# Patient Record
Sex: FEMALE | Employment: OTHER | URBAN - METROPOLITAN AREA
[De-identification: names, ages, dates, MRNs, and addresses within clinical notes are randomized per-mention and may not be internally consistent; named-entity substitution may affect disease eponyms.]

---

## 2017-02-20 ENCOUNTER — OFFICE VISIT (OUTPATIENT)
Dept: PRIMARY CARE CLINIC | Age: 67
End: 2017-02-20

## 2017-02-20 VITALS
WEIGHT: 182 LBS | HEIGHT: 63 IN | DIASTOLIC BLOOD PRESSURE: 70 MMHG | HEART RATE: 83 BPM | TEMPERATURE: 97.7 F | SYSTOLIC BLOOD PRESSURE: 120 MMHG | OXYGEN SATURATION: 98 % | BODY MASS INDEX: 32.25 KG/M2 | RESPIRATION RATE: 16 BRPM

## 2017-02-20 DIAGNOSIS — R81 GLYCOSURIA: ICD-10-CM

## 2017-02-20 DIAGNOSIS — I10 HTN (HYPERTENSION), BENIGN: ICD-10-CM

## 2017-02-20 DIAGNOSIS — E03.9 HYPOTHYROIDISM (ACQUIRED): ICD-10-CM

## 2017-02-20 DIAGNOSIS — M17.12 PRIMARY OSTEOARTHRITIS OF LEFT KNEE: Primary | ICD-10-CM

## 2017-02-20 DIAGNOSIS — M79.7 FIBROMYALGIA: ICD-10-CM

## 2017-02-20 DIAGNOSIS — E78.2 MIXED HYPERLIPIDEMIA: ICD-10-CM

## 2017-02-20 DIAGNOSIS — E11.51 DM (DIABETES MELLITUS) TYPE II CONTROLLED PERIPHERAL VASCULAR DISORDER: ICD-10-CM

## 2017-02-20 LAB
BILIRUBIN, POC: ABNORMAL
BLOOD URINE, POC: ABNORMAL
CLARITY, POC: CLEAR
COLOR, POC: ABNORMAL
CREATININE URINE: 46.6 MG/DL
GLUCOSE URINE, POC: ABNORMAL
KETONES, POC: ABNORMAL
LEUKOCYTE EST, POC: ABNORMAL
MICROALBUMIN UR-MCNC: <1.2 MG/DL
MICROALBUMIN/CREAT UR-RTO: NORMAL MG/G (ref 0–30)
NITRITE, POC: ABNORMAL
PH, POC: 6
PROTEIN, POC: ABNORMAL
SPECIFIC GRAVITY, POC: 1.03
UROBILINOGEN, POC: ABNORMAL

## 2017-02-20 PROCEDURE — 81003 URINALYSIS AUTO W/O SCOPE: CPT | Performed by: FAMILY MEDICINE

## 2017-02-20 PROCEDURE — 99214 OFFICE O/P EST MOD 30 MIN: CPT | Performed by: FAMILY MEDICINE

## 2017-02-20 RX ORDER — TIZANIDINE 2 MG/1
2 TABLET ORAL EVERY 8 HOURS PRN
Qty: 90 TABLET | Refills: 1 | Status: SHIPPED | OUTPATIENT
Start: 2017-02-20 | End: 2017-06-26 | Stop reason: SDUPTHER

## 2017-02-22 LAB — URINE CULTURE, ROUTINE: NORMAL

## 2017-03-03 ENCOUNTER — HOSPITAL ENCOUNTER (OUTPATIENT)
Dept: MRI IMAGING | Age: 67
Discharge: HOME | End: 2017-03-03
Payer: MEDICARE

## 2017-03-03 DIAGNOSIS — M17.12 PRIMARY OSTEOARTHRITIS OF LEFT KNEE: ICD-10-CM

## 2017-03-03 PROCEDURE — 73721 MRI JNT OF LWR EXTRE W/O DYE: CPT

## 2017-03-04 ASSESSMENT — ENCOUNTER SYMPTOMS
SHORTNESS OF BREATH: 0
ABDOMINAL PAIN: 0
COUGH: 0
CHEST TIGHTNESS: 0

## 2017-03-08 ENCOUNTER — TELEPHONE (OUTPATIENT)
Dept: PRIMARY CARE CLINIC | Age: 67
End: 2017-03-08

## 2017-03-08 DIAGNOSIS — M17.12 PRIMARY OSTEOARTHRITIS OF LEFT KNEE: Primary | ICD-10-CM

## 2017-03-09 ENCOUNTER — OFFICE VISIT (OUTPATIENT)
Dept: PRIMARY CARE CLINIC | Age: 67
End: 2017-03-09

## 2017-03-09 VITALS
HEART RATE: 70 BPM | BODY MASS INDEX: 31.89 KG/M2 | WEIGHT: 180 LBS | SYSTOLIC BLOOD PRESSURE: 112 MMHG | HEIGHT: 63 IN | OXYGEN SATURATION: 98 % | RESPIRATION RATE: 16 BRPM | DIASTOLIC BLOOD PRESSURE: 60 MMHG | TEMPERATURE: 97 F

## 2017-03-09 DIAGNOSIS — E11.51 DM (DIABETES MELLITUS) TYPE II CONTROLLED PERIPHERAL VASCULAR DISORDER: ICD-10-CM

## 2017-03-09 DIAGNOSIS — I10 HTN (HYPERTENSION), BENIGN: Primary | ICD-10-CM

## 2017-03-09 DIAGNOSIS — E78.2 MIXED HYPERLIPIDEMIA: ICD-10-CM

## 2017-03-09 DIAGNOSIS — M79.7 FIBROMYALGIA: ICD-10-CM

## 2017-03-09 DIAGNOSIS — M17.12 OSTEOARTHRITIS OF LEFT KNEE, UNSPECIFIED OSTEOARTHRITIS TYPE: ICD-10-CM

## 2017-03-09 PROCEDURE — 99213 OFFICE O/P EST LOW 20 MIN: CPT | Performed by: FAMILY MEDICINE

## 2017-03-09 RX ORDER — EZETIMIBE 10 MG/1
TABLET ORAL
COMMUNITY
Start: 2017-01-17

## 2017-03-09 ASSESSMENT — ENCOUNTER SYMPTOMS
EYES NEGATIVE: 1
PHOTOPHOBIA: 0
EYE REDNESS: 0
DIARRHEA: 0
EYE ITCHING: 0
SHORTNESS OF BREATH: 0
BACK PAIN: 0
ABDOMINAL PAIN: 0
CONSTIPATION: 0
RESPIRATORY NEGATIVE: 1
GASTROINTESTINAL NEGATIVE: 1
WHEEZING: 0

## 2017-03-24 LAB
CHOLESTEROL, TOTAL: 154 MG/DL
CHOLESTEROL/HDL RATIO: NORMAL
HBA1C MFR BLD: 6.7 %
HDLC SERPL-MCNC: 44 MG/DL (ref 35–70)
LDL CHOLESTEROL CALCULATED: 87 MG/DL (ref 0–160)
TRIGL SERPL-MCNC: 115 MG/DL
VLDLC SERPL CALC-MCNC: 23 MG/DL

## 2017-03-27 ENCOUNTER — TELEPHONE (OUTPATIENT)
Dept: PRIMARY CARE CLINIC | Age: 67
End: 2017-03-27

## 2017-06-26 DIAGNOSIS — M79.7 FIBROMYALGIA: ICD-10-CM

## 2017-06-26 RX ORDER — TIZANIDINE 2 MG/1
TABLET ORAL
Qty: 90 TABLET | Refills: 1 | Status: SHIPPED | OUTPATIENT
Start: 2017-06-26 | End: 2017-09-14 | Stop reason: SDUPTHER

## 2017-09-14 ENCOUNTER — OFFICE VISIT (OUTPATIENT)
Dept: PRIMARY CARE CLINIC | Age: 67
End: 2017-09-14

## 2017-09-14 VITALS
HEART RATE: 83 BPM | BODY MASS INDEX: 32.07 KG/M2 | WEIGHT: 181 LBS | DIASTOLIC BLOOD PRESSURE: 60 MMHG | TEMPERATURE: 97.7 F | OXYGEN SATURATION: 95 % | SYSTOLIC BLOOD PRESSURE: 116 MMHG | RESPIRATION RATE: 16 BRPM | HEIGHT: 63 IN

## 2017-09-14 DIAGNOSIS — M17.12 PRIMARY OSTEOARTHRITIS OF LEFT KNEE: Primary | ICD-10-CM

## 2017-09-14 DIAGNOSIS — M79.7 FIBROMYALGIA: ICD-10-CM

## 2017-09-14 DIAGNOSIS — G89.29 CHRONIC RIGHT SHOULDER PAIN: ICD-10-CM

## 2017-09-14 DIAGNOSIS — Z23 NEED FOR INFLUENZA VACCINATION: ICD-10-CM

## 2017-09-14 DIAGNOSIS — M25.511 CHRONIC RIGHT SHOULDER PAIN: ICD-10-CM

## 2017-09-14 PROCEDURE — 99214 OFFICE O/P EST MOD 30 MIN: CPT | Performed by: FAMILY MEDICINE

## 2017-09-14 PROCEDURE — 20610 DRAIN/INJ JOINT/BURSA W/O US: CPT | Performed by: FAMILY MEDICINE

## 2017-09-14 PROCEDURE — 90662 IIV NO PRSV INCREASED AG IM: CPT | Performed by: FAMILY MEDICINE

## 2017-09-14 RX ORDER — DICLOFENAC SODIUM 75 MG/1
75 TABLET, DELAYED RELEASE ORAL 2 TIMES DAILY
Qty: 60 TABLET | Refills: 0 | Status: SHIPPED | OUTPATIENT
Start: 2017-09-14 | End: 2017-12-30 | Stop reason: SDUPTHER

## 2017-09-14 RX ORDER — HYDROCODONE BITARTRATE AND ACETAMINOPHEN 5; 325 MG/1; MG/1
1 TABLET ORAL EVERY 6 HOURS PRN
Qty: 28 TABLET | Refills: 0 | Status: SHIPPED | OUTPATIENT
Start: 2017-09-14

## 2017-09-14 RX ORDER — METHYLPREDNISOLONE ACETATE 80 MG/ML
80 INJECTION, SUSPENSION INTRA-ARTICULAR; INTRALESIONAL; INTRAMUSCULAR; SOFT TISSUE ONCE
Status: COMPLETED | OUTPATIENT
Start: 2017-09-14 | End: 2017-09-14

## 2017-09-14 RX ORDER — TIZANIDINE 2 MG/1
TABLET ORAL
Qty: 90 TABLET | Refills: 1 | Status: SHIPPED | OUTPATIENT
Start: 2017-09-14 | End: 2017-12-30 | Stop reason: SDUPTHER

## 2017-09-14 RX ADMIN — METHYLPREDNISOLONE ACETATE 80 MG: 80 INJECTION, SUSPENSION INTRA-ARTICULAR; INTRALESIONAL; INTRAMUSCULAR; SOFT TISSUE at 15:42

## 2017-09-14 ASSESSMENT — ENCOUNTER SYMPTOMS
SORE THROAT: 0
NAUSEA: 0
CONSTIPATION: 0
VOMITING: 0
COUGH: 0
WHEEZING: 0
DIARRHEA: 0
SINUS PRESSURE: 0
RESPIRATORY NEGATIVE: 1
ABDOMINAL PAIN: 0
BACK PAIN: 0
SHORTNESS OF BREATH: 0

## 2017-09-18 ENCOUNTER — TELEPHONE (OUTPATIENT)
Dept: PRIMARY CARE CLINIC | Age: 67
End: 2017-09-18

## 2017-10-02 ENCOUNTER — TELEPHONE (OUTPATIENT)
Dept: PRIMARY CARE CLINIC | Age: 67
End: 2017-10-02

## 2017-10-02 DIAGNOSIS — Z12.11 SCREENING FOR COLON CANCER: Primary | ICD-10-CM

## 2017-10-02 DIAGNOSIS — M25.511 RIGHT SHOULDER PAIN, UNSPECIFIED CHRONICITY: ICD-10-CM

## 2017-10-26 ENCOUNTER — HOSPITAL ENCOUNTER (OUTPATIENT)
Dept: PHYSICAL THERAPY | Age: 67
Setting detail: THERAPIES SERIES
Discharge: HOME | End: 2017-10-26
Payer: MEDICARE

## 2017-10-26 PROCEDURE — G8985 CARRY GOAL STATUS: HCPCS

## 2017-10-26 PROCEDURE — 97162 PT EVAL MOD COMPLEX 30 MIN: CPT

## 2017-10-26 PROCEDURE — G8984 CARRY CURRENT STATUS: HCPCS

## 2017-10-26 ASSESSMENT — PAIN DESCRIPTION - PAIN TYPE: TYPE: CHRONIC PAIN

## 2017-10-26 ASSESSMENT — PAIN DESCRIPTION - LOCATION: LOCATION: NECK;ARM;SHOULDER

## 2017-10-26 ASSESSMENT — PAIN DESCRIPTION - DESCRIPTORS: DESCRIPTORS: STABBING

## 2017-10-26 ASSESSMENT — PAIN SCALES - GENERAL: PAINLEVEL_OUTOF10: 8

## 2017-10-26 ASSESSMENT — PAIN DESCRIPTION - ORIENTATION: ORIENTATION: RIGHT

## 2017-10-26 ASSESSMENT — PAIN DESCRIPTION - FREQUENCY: FREQUENCY: CONTINUOUS

## 2017-10-26 NOTE — PROGRESS NOTES
[facility]   Outpatient Physical Therapy   Evaluation      [] [address]  [x] [facility]     Date: 10/26/2017  Patient: Bisi Montiel  : 1950  ACCT #: [de-identified]  Referring physician: Referring Practitioner: Dr. Nikolai Herbert     Referring Practitioner: Dr. Nikolai Herbert     Referral Date : 10/02/17    Diagnosis: Rt shoulder pain     Treatment Diagnosis: Rt UE pain, Rt UE weakness, decreased Rt UE ROM, impaired ADLs, decreased cervical ROM   PT Insurance Information: BCBS Medicare  Total # of Visits Approved: 1   Total # of Visits to Date: 1  No Show: 0    History   has a past medical history of CAD (coronary artery disease); Fibromyalgia; Hyperlipidemia; Hypertension; and Type II or unspecified type diabetes mellitus without mention of complication, not stated as uncontrolled. has a past surgical history that includes ovarian cyst removal;  section; Coronary angioplasty with stent; Carotid-subclavian Bypass Graft; and other surgical history (2016). Allergies   Allergen Reactions    Actos [Pioglitazone Hydrochloride]      Current Outpatient Prescriptions on File Prior to Encounter   Medication Sig Dispense Refill    tiZANidine (ZANAFLEX) 2 MG tablet TAKE 1 TABLET EVERY 8 HOURS AS NEEDED 90 tablet 1    HYDROcodone-acetaminophen (NORCO) 5-325 MG per tablet Take 1 tablet by mouth every 6 hours as needed for Pain . Earliest Fill Date: 17 28 tablet 0    diclofenac (VOLTAREN) 75 MG EC tablet Take 1 tablet by mouth 2 times daily 60 tablet 0    ZETIA 10 MG tablet       dapagliflozin (FARXIGA) 10 MG tablet Take 1 tablet by mouth      gabapentin (NEURONTIN) 300 MG capsule Take 300 mg by mouth      Vitamin D (CHOLECALCIFEROL) 1000 UNITS CAPS capsule Take 2,000 Units by mouth daily.       clopidogrel (PLAVIX) 75 MG tablet TAKE 1 TABLET DAILY 90 tablet 1    glimepiride (AMARYL) 4 MG tablet Take 4 mg by mouth 2 times daily.  metformin (GLUCOPHAGE) 500 MG tablet Take 750 mg by mouth daily.  Insulin Pen Needle (B-D UF III MINI PEN NEEDLES) 31G X 5 MM MISC by Does not apply route.  fexofenadine (ALLEGRA) 180 MG tablet Take 180 mg by mouth daily.  isosorbide mononitrate (IMDUR) 30 MG CR tablet Take 30 mg by mouth daily.  aspirin 81 MG EC tablet Take 81 mg by mouth daily.  Multiple Vitamin (MULTIVITAMIN PO) Take  by mouth daily.  losartan (COZAAR) 25 MG tablet Take 1 tablet by mouth daily. 30 tablet 3     Current Facility-Administered Medications on File Prior to Encounter   Medication Dose Route Frequency Provider Last Rate Last Dose    methylPREDNISolone acetate (DEPO-MEDROL) injection 80 mg  80 mg Intra-articular Once Caitlyn Back, DO        triamcinolone acetonide (KENALOG-40) injection 80 mg  80 mg Intramuscular Once Caitlyn Back,         cefTRIAXone (ROCEPHIN) injection 1 g  1 g Intramuscular Once Caitlyn Back,         methylPREDNISolone acetate (DEPO-MEDROL) injection 80 mg  80 mg Intra-articular Once Caitlyn Back, DO            Subjective  Subjective: Pt reports insidious onset of Rt shoulder pain ~3 mos. Reports initial fluctuating symptoms. Now more constant pain. Reports pain initiates in Rt UT/ cervical area and radiates into shoulder and down into elbow posterolateral side. Pt states no Xrays or MRI at this time. Referred for PT due to good results in the past for Rt shoulder ~ 8 yrs ago. Pt saw neurosurgeon this am for low back, recommending surgery. pt states tylenol, gabapentin and muscle relaxer. Takes Norco ~ 1 time per week.     Additional Pertinent Hx: CAD, DM, cervical lami ~20 yrs ago, OA, cardiac stents and renal stent  Pain Screening  Patient Currently in Pain: Yes  Pain Assessment  Pain Assessment: 0-10  Pain Level: 8 (Range: 4-10/10 )  Pain Type: Chronic pain  Pain Location: Neck, Arm, Shoulder  Pain Orientation: Description same as pre-pain unless otherwise indicated. Action: [] NA  [x] Call Physician  [x] Perform HEP  [x] Meds as prescribed     Assessment   Conditions Requiring Skilled Therapeutic Intervention  Body structures, Functions, Activity limitations: Decreased ADL status, Decreased ROM, Decreased strength, Decreased coordination  Assessment: Pt with tenderness throughout Rt shldr jt, UT, biceps, and lateral epicondyle. Pt with h/o cervical laminectomy. Noted sig decrease cervical, as well as Rt UE, ROM. Pt denies numbness or tingling. States pain originates in Rt UT area and radiates to Rt lateral UE to elbow. Pt with h/o chronic back pain, states neurosurgeon is recommending surgery, which may impact this therapy. Treatment Diagnosis: Rt UE pain, Rt UE weakness, decreased Rt UE ROM, impaired ADLs, decreased cervical ROM   Prognosis: Good  Decision Making: Medium Complexity  History: personal factors: age, more sedentary lifestyle; contributing PMH: CAD, DM, cervical lami ~20 yrs ago, OA, cardiac stents and renal stent  Exam: musculoskeletal, pain and sensory systems involved impacting ADLs, strength, ROM; UEFI 42/80  Clinical Presentation: evolving - worsening of symptoms   Patient Education: therapy process, POC, keep stretches and HEP WFL with minimal pain and discomfort   Barriers to Learning: no  REQUIRES PT FOLLOW UP: Yes    Patient Education   Patient Education: therapy process, POC, keep stretches and HEP WFL with minimal pain and discomfort     Pt verbalized/demonstrated good understanding:     [x] Yes         [] No, pt required further clarification. G Code:     PT G-Codes  Functional Assessment Tool Used: UEFI and clinical judgment   Score: 42/80  Functional Limitation: Carrying, moving and handling objects  Carrying, Moving and Handling Objects Current Status ():  At least 40 percent but less than 60 percent impaired, limited or restricted  Carrying, Moving and Handling Objects Goal Status (): At least 20 percent but less than 40 percent impaired, limited or restricted    Goals   Patient goal: Patient goals : \"relieve pain\"     Short term goals  Time Frame for Short term goals: 2 wks   Short term goal 1: Independent with HEP for strengthening, flexibility, and improved function     Long term goals  Time Frame for Long term goals : 6 wks   Long term goal 1: Improve Rt UE strength >/= 4+/5 to allow improve ease with vacuuming  Long term goal 2: Improve Rt UE ROM WFL to allow pt to wash and do hair without difficulty   Long term goal 3: Improve cervical ROM by 5-10 degrees each plane for improved posture and decreased pain UT   Long term goal 4: UEFI >/= 60/80 to demonstrate overall improved function     Plan:  Plan  Times per week: 2, progress to 1x per week as appropriate   Plan weeks: 6  Current Treatment Recommendations: Strengthening, ROM, Endurance Training, Manual Therapy - Soft Tissue Mobilization, Manual Therapy - Joint Manipulation, Home Exercise Program, Pain Management, Patient/Caregiver Education & Training, Modalities       Evaluation and patient rights have been reviewed and patient agrees with plan of care.   Yes  [x]  No  []   Explain:     Signature: Electronically signed by Rfafi Montes PT on 10/26/2017 at 2:49 PM    PT Individual Minutes  Time In: 7845  Time Out: 2599  Minutes: 27  Timed Code Treatment Minutes: 0 Minutes (requires insurance authorization )  Procedure Minutes: 27 vignesh Bustos Fall Risk Assessment  Risk Factor Scale  Score   History of Falls [] Yes  [x] No 25  0 0   Secondary Diagnosis [] Yes  [x] No 15  0 0   Ambulatory Aid [] Furniture  [] Crutches/cane/walker  [x] None/bedrest/wheelchair/nurse 30  15  0 0   IV/Heparin Lock [] Yes  [x] No 20  0 0   Gait/Transferring [] Impaired  [] Weak  [x] Normal/bedrest/immobile 20  10  0 0   Mental Status [] Forgets limitations  [x] Oriented to own ability 15  0 0      Total: 0     Based on the Assessment score:

## 2017-10-26 NOTE — PLAN OF CARE
AROM : Exceptions  L Shoulder Flexion 0-180: 176  L Shoulder ABduction 0-180: 152  L Shoulder Int Rotation  0-70: 74  L Shoulder Ext Rotation  0-90: 71   [] yes  [] no   Long term goal 3: Improve cervical ROM by 5-10 degrees each plane for improved posture and decreased pain UT   Spine  Cervical: flex 42, ext 33, Rt SB 22, Lt SB 38    [] yes  [] no   Long term goal 4: UEFI >/= 60/80 to demonstrate overall improved function  UEFI: 42/80 [] yes  [] no     Body structures, Functions, Activity limitations: Decreased ADL status, Decreased ROM, Decreased strength, Decreased coordination  Assessment: Pt with tenderness throughout Rt shldr jt, UT, biceps, and lateral epicondyle. Pt with h/o cervical laminectomy. Noted sig decrease cervical, as well as Rt UE, ROM. Pt denies numbness or tingling. States pain originates in Rt UT area and radiates to Rt lateral UE to elbow. Pt with h/o chronic back pain, states neurosurgeon is recommending surgery, which may impact this therapy. Prognosis: Good  New Education Provided: therapy process, POC, keep stretches and HEP WFL with minimal pain and discomfort   G-Codes  PT G-Codes  Functional Assessment Tool Used: UEFI and clinical judgment   Score: 42/80  Functional Limitation: Carrying, moving and handling objects  Carrying, Moving and Handling Objects Current Status (): At least 40 percent but less than 60 percent impaired, limited or restricted  Carrying, Moving and Handling Objects Goal Status ():  At least 20 percent but less than 40 percent impaired, limited or restricted    PLAN: [x] Evaluate and Treat  Frequency/Duration:  Plan  Times per week: 2, progress to 1x per week as appropriate   Plan weeks: 6  Current Treatment Recommendations: Strengthening, ROM, Endurance Training, Manual Therapy - Soft Tissue Mobilization, Manual Therapy - Joint Manipulation, Home Exercise Program, Pain Management, Patient/Caregiver Education & Training, Modalities     Precautions:

## 2017-11-06 ENCOUNTER — HOSPITAL ENCOUNTER (OUTPATIENT)
Dept: PHYSICAL THERAPY | Age: 67
Setting detail: THERAPIES SERIES
Discharge: HOME | End: 2017-11-06
Payer: MEDICARE

## 2017-11-06 PROCEDURE — 97140 MANUAL THERAPY 1/> REGIONS: CPT

## 2017-11-06 PROCEDURE — 97110 THERAPEUTIC EXERCISES: CPT

## 2017-11-06 PROCEDURE — 97035 APP MDLTY 1+ULTRASOUND EA 15: CPT

## 2017-11-06 ASSESSMENT — PAIN DESCRIPTION - DESCRIPTORS: DESCRIPTORS: ACHING;STABBING

## 2017-11-06 ASSESSMENT — PAIN SCALES - GENERAL: PAINLEVEL_OUTOF10: 7

## 2017-11-06 ASSESSMENT — PAIN DESCRIPTION - LOCATION: LOCATION: ARM;NECK;SHOULDER

## 2017-11-06 NOTE — PROGRESS NOTES
Perform HEP  [] Meds as prescribed     ASSESSMENT:     Conditions Requiring Skilled Therapeutic Intervention  Assessment: Initiated exercises for strengthening and to improve ROM. Initiated US and manual to decrease spasm and pain and improve ROM. Good tolerance to treatment and HEP given. Pt states may not be able to attend another session this week due to receiving back injections. Tolerance to treatment: [x] Good   [] Fair   [] Poor  [] Fatigued   [] Increased pain   Limited by:    Goals:     Short term goals  Time Frame for Short term goals: 2 wks   Short term goal 1: Independent with HEP for strengthening, flexibility, and improved function   Long term goals  Time Frame for Long term goals : 6 wks   Long term goal 1: Improve Rt UE strength >/= 4+/5 to allow improve ease with vacuuming  Long term goal 2: Improve Rt UE ROM WFL to allow pt to wash and do hair without difficulty   Long term goal 3: Improve cervical ROM by 5-10 degrees each plane for improved posture and decreased pain UT   Long term goal 4: UEFI >/= 60/80 to demonstrate overall improved function     Progress toward goals: ROM   Goals Met:    []  See updated POC   Comments:    PLAN:  [x] Continue POC to pt tolerance  [] Hold PT for ___ days.   See note to physician  [] Discharge PT    Signature:   Electronically signed by Harley Villatoro PT on 11/6/17 at 3:59 PM    PT Individual Minutes  Time In: 5699  Time Out: 6115  Minutes: 54  Timed Code Treatment Minutes: 54 Minutes

## 2017-11-13 ENCOUNTER — HOSPITAL ENCOUNTER (OUTPATIENT)
Dept: PHYSICAL THERAPY | Age: 67
Setting detail: THERAPIES SERIES
Discharge: HOME | End: 2017-11-13
Payer: MEDICARE

## 2017-11-13 PROCEDURE — 97140 MANUAL THERAPY 1/> REGIONS: CPT

## 2017-11-13 PROCEDURE — 97110 THERAPEUTIC EXERCISES: CPT

## 2017-11-13 PROCEDURE — 97035 APP MDLTY 1+ULTRASOUND EA 15: CPT

## 2017-11-13 ASSESSMENT — PAIN DESCRIPTION - PAIN TYPE: TYPE: CHRONIC PAIN

## 2017-11-13 ASSESSMENT — PAIN SCALES - GENERAL: PAINLEVEL_OUTOF10: 6

## 2017-11-13 ASSESSMENT — PAIN DESCRIPTION - ORIENTATION: ORIENTATION: RIGHT

## 2017-11-13 ASSESSMENT — PAIN DESCRIPTION - LOCATION: LOCATION: ARM;NECK;SHOULDER

## 2017-11-13 ASSESSMENT — PAIN DESCRIPTION - DESCRIPTORS: DESCRIPTORS: ACHING

## 2017-11-13 NOTE — PROGRESS NOTES
Call Physician  [x] Perform HEP  [] Meds as prescribed     ASSESSMENT:     Conditions Requiring Skilled Therapeutic Intervention  Body structures, Functions, Activity limitations: Decreased ADL status, Decreased ROM, Decreased strength, Decreased coordination  Assessment: Pt tolerating all strengthening ex's well w/o increase in pain reported throughout session. Initiated manual distraction and STM to R UT and deltoid dto decrease pain w/ good tolerance. VC's during pendulums for passive movement vs AROM. Concluded w/ US w/ reports of decreased pain post tx session. Treatment Diagnosis: Rt UE pain, Rt UE weakness, decreased Rt UE ROM, impaired ADLs, decreased cervical ROM   Prognosis: Good  REQUIRES PT FOLLOW UP: Yes     Tolerance to treatment: [x] Good   [] Fair   [] Poor  [] Fatigued   [] Increased pain   Limited by:    Goals:  Short term goals  Time Frame for Short term goals: 2 wks   Short term goal 1: Independent with HEP for strengthening, flexibility, and improved function   Long term goals  Time Frame for Long term goals : 6 wks   Long term goal 1: Improve Rt UE strength >/= 4+/5 to allow improve ease with vacuuming  Long term goal 2: Improve Rt UE ROM WFL to allow pt to wash and do hair without difficulty   Long term goal 3: Improve cervical ROM by 5-10 degrees each plane for improved posture and decreased pain UT   Long term goal 4: UEFI >/= 60/80 to demonstrate overall improved function     Progress toward goals: strength, ROM  Goals Met:    []  See updated POC   Comments:    PLAN:  [x] Continue POC to pt tolerance  [] Hold PT for ___ days.   See note to physician  [] Discharge PT    Signature:   Electronically signed by Lynne Coffman PTA on 11/13/17 at 2:13 PM    PT Individual Minutes  Time In: 1077  Time Out: 3116  Minutes: 54  Timed Code Treatment Minutes: 54 Minutes

## 2017-11-15 ENCOUNTER — HOSPITAL ENCOUNTER (OUTPATIENT)
Dept: PHYSICAL THERAPY | Age: 67
Setting detail: THERAPIES SERIES
Discharge: HOME | End: 2017-11-15
Payer: MEDICARE

## 2017-11-20 ENCOUNTER — HOSPITAL ENCOUNTER (OUTPATIENT)
Dept: PHYSICAL THERAPY | Age: 67
Setting detail: THERAPIES SERIES
Discharge: HOME | End: 2017-11-20
Payer: MEDICARE

## 2017-11-20 PROCEDURE — 97110 THERAPEUTIC EXERCISES: CPT

## 2017-11-20 PROCEDURE — 97140 MANUAL THERAPY 1/> REGIONS: CPT

## 2017-11-20 PROCEDURE — 97035 APP MDLTY 1+ULTRASOUND EA 15: CPT

## 2017-11-20 ASSESSMENT — PAIN SCALES - GENERAL: PAINLEVEL_OUTOF10: 6

## 2017-11-20 ASSESSMENT — PAIN DESCRIPTION - ORIENTATION: ORIENTATION: RIGHT

## 2017-11-20 ASSESSMENT — PAIN DESCRIPTION - DESCRIPTORS: DESCRIPTORS: ACHING

## 2017-11-20 ASSESSMENT — PAIN DESCRIPTION - FREQUENCY: FREQUENCY: CONTINUOUS

## 2017-11-20 ASSESSMENT — PAIN DESCRIPTION - PAIN TYPE: TYPE: CHRONIC PAIN

## 2017-11-20 ASSESSMENT — PAIN DESCRIPTION - LOCATION: LOCATION: ARM

## 2017-11-20 NOTE — PROGRESS NOTES
[facility]   Outpatient Physical Therapy   Treatment Note  [] [address]  [x] [facility]            of [address]  Date: 2017  Patient: Saji Canales  : 1950  ACCT #: [de-identified]  Referring Practitioner: Dr. Walter Barba   Diagnosis: Rt shoulder pain     Visit Information:  PT Visit Information  PT Insurance Information: BCBS Medicare  Total # of Visits Approved: 6  Total # of Visits to Date: 4  Plan of Care/Certification Expiration Date: 17  No Show: 0  Canceled Appointment: 0  Progress Note Counter: 3/6    SUBJECTIVE:      HEP Compliance:  [x] Good [] Fair [] Poor [] Reports not doing due to:    PAIN   Location:   Pain Location: Arm right  Pain Rating (0-10 pain scale):  Pain Level: 6  Pain Description:  Pain Descriptors: Aching  Action:  [x] Acceptable for treatment  []  Other:    OBJECTIVE:   Exercises  Exercise 1: posture exs x15  Exercise 2: pulleys flex x4 min, initiated abduction x 3 minutes  Exercise 3: pendulums x15 lateral, AP, circles with 1# weight  Exercise 6: table slides 5s/10 flex, abd   Exercise 7: UT str 30s/3   Exercise 8: distraction 30s x 3  Exercise 9: horizontal abd and retraction over pball on chair    Manual: []  NA   Manual therapy  PROM: shldr all planes   Soft Tissue Mobalization: cervical distraction, UT/deltoid STM    Modalities: []  NA  Modalities  Moist heat: nv for pain management  Ultrasound: 1st 1.2 w/cm2, 2.0 MHz, x8 minutes Rt anterolateral shldr   E-stim (parameters): nv for pain management   Mobility: [x]  NA    Strength: [x] NT    ROM: [] NT                  AROM RUE (degrees)  RUE AROM : Exceptions  R Shoulder Flexion 0-180: 130  R Shoulder ABduction 0-180: 117             HEP  Continue with current Home Exercise Program.  See Objective section for progression of HEP.       Comments:       POST-PAIN    Pain Rating (0-10 pain scale): 6/10  Location and Pain Description same as pre-pain unless otherwise indicated. Action: [] NA  [x] Call Physician  [x] Perform HEP  [x] Meds as prescribed     ASSESSMENT:     Conditions Requiring Skilled Therapeutic Intervention  Assessment: Pt with increase guarding and pain with end range with manual. Verbal cues with pulleys to isolate right UE movement     Tolerance to treatment: [x] Good   [] Fair   [] Poor  [] Fatigued   [] Increased pain   Limited by:    Goals:     Short term goals  Time Frame for Short term goals: 2 wks   Short term goal 1: Independent with HEP for strengthening, flexibility, and improved function   Long term goals  Time Frame for Long term goals : 6 wks   Long term goal 1: Improve Rt UE strength >/= 4+/5 to allow improve ease with vacuuming  Long term goal 2: Improve Rt UE ROM WFL to allow pt to wash and do hair without difficulty   Long term goal 3: Improve cervical ROM by 5-10 degrees each plane for improved posture and decreased pain UT   Long term goal 4: UEFI >/= 60/80 to demonstrate overall improved function     Progress toward goals:  Goals Met:    []  See updated POC   Comments:    PLAN:  [x] Continue POC to pt tolerance  [] Hold PT for ___ days.   See note to physician  [] Discharge PT    Signature:   Electronically signed by Luis Steiner PTA on 11/20/17 at 2:58 PM    PT Individual Minutes  Time In: 1400  Time Out: 3146  Minutes: 58   Activity Minutes Units   Ther Ex 35 2   Manual   15  1   Neuro Ed     US 8 1       Timed Code Treatment Minutes: 58 Minutes

## 2017-11-29 ENCOUNTER — HOSPITAL ENCOUNTER (OUTPATIENT)
Dept: PHYSICAL THERAPY | Age: 67
Setting detail: THERAPIES SERIES
Discharge: HOME | End: 2017-11-29
Payer: MEDICARE

## 2017-11-29 PROCEDURE — G0283 ELEC STIM OTHER THAN WOUND: HCPCS

## 2017-11-29 PROCEDURE — 97035 APP MDLTY 1+ULTRASOUND EA 15: CPT

## 2017-11-29 PROCEDURE — 97110 THERAPEUTIC EXERCISES: CPT

## 2017-11-29 ASSESSMENT — PAIN DESCRIPTION - ORIENTATION: ORIENTATION: RIGHT

## 2017-11-29 ASSESSMENT — PAIN DESCRIPTION - DESCRIPTORS: DESCRIPTORS: ACHING

## 2017-11-29 ASSESSMENT — PAIN DESCRIPTION - LOCATION: LOCATION: ARM

## 2017-11-29 ASSESSMENT — PAIN SCALES - GENERAL: PAINLEVEL_OUTOF10: 5

## 2017-11-29 NOTE — PROGRESS NOTES
her last visit 2* copay amount. Progressed HEP and discussed self HEP progression. Pt with good understanding of HEP instructions. Patient Education: KT procotol and removal technique for skin protections     Tolerance to treatment: [x] Good   [] Fair   [] Poor  [] Fatigued   [] Increased pain   Limited by:    Goals:     Short term goals  Time Frame for Short term goals: 2 wks   Short term goal 1: Independent with HEP for strengthening, flexibility, and improved function   Long term goals  Time Frame for Long term goals : 6 wks   Long term goal 1: Improve Rt UE strength >/= 4+/5 to allow improve ease with vacuuming  Long term goal 2: Improve Rt UE ROM WFL to allow pt to wash and do hair without difficulty   Long term goal 3: Improve cervical ROM by 5-10 degrees each plane for improved posture and decreased pain UT   Long term goal 4: UEFI >/= 60/80 to demonstrate overall improved function     Progress toward goals:all  Goals Met:    []  See updated POC   Comments:    PLAN:  [x] Continue POC to pt tolerance  [] Hold PT for ___ days.   See note to physician  [] Discharge PT    Signature:   Electronically signed by Cindy Oliveira PTA on 11/29/17 at 12:31 PM    PT Individual Minutes  Time In: 1781  Time Out: 1330  Minutes: 60  Timed Code Treatment Minutes: 45 Minutes

## 2017-12-05 ENCOUNTER — HOSPITAL ENCOUNTER (OUTPATIENT)
Dept: PHYSICAL THERAPY | Age: 67
Setting detail: THERAPIES SERIES
Discharge: HOME | End: 2017-12-05
Payer: MEDICARE

## 2017-12-05 PROCEDURE — G8986 CARRY D/C STATUS: HCPCS

## 2017-12-05 PROCEDURE — G8985 CARRY GOAL STATUS: HCPCS

## 2017-12-05 PROCEDURE — 97110 THERAPEUTIC EXERCISES: CPT

## 2017-12-05 PROCEDURE — 97035 APP MDLTY 1+ULTRASOUND EA 15: CPT

## 2017-12-05 ASSESSMENT — PAIN DESCRIPTION - ORIENTATION: ORIENTATION: RIGHT

## 2017-12-05 ASSESSMENT — PAIN SCALES - GENERAL: PAINLEVEL_OUTOF10: 6

## 2017-12-05 ASSESSMENT — PAIN DESCRIPTION - DESCRIPTORS: DESCRIPTORS: ACHING

## 2017-12-05 ASSESSMENT — PAIN DESCRIPTION - LOCATION: LOCATION: ARM

## 2017-12-05 NOTE — PROGRESS NOTES
[facility]   Outpatient Physical Therapy   Treatment Note  [] [address]  [x] [facility]            of [address]  Date: 2017  Patient: Bisi Montiel  : 1950  ACCT #: [de-identified]  Referring Practitioner: Dr. Nikolai Herbert   Diagnosis: Rt shoulder pain      Visit Information:  PT Visit Information  PT Insurance Information: BCBS Medicare  Total # of Visits Approved: 6  Total # of Visits to Date: 6  Plan of Care/Certification Expiration Date: 17  No Show: 0  Canceled Appointment: 0  Progress Note Counter:     SUBJECTIVE:   Subjective  Subjective: Reports with questions regarding cane raises. Reports still having pain with getting milk out. However, having an easier time doing hair. Reports even though still having pain, still would like today to be her last visit 2* copay amount.          HEP Compliance:  [x] Good [] Fair [] Poor [] Reports not doing due to:    PAIN   Location:   Pain Location: Arm  Pain Rating (0-10 pain scale):  Pain Level: 6  Pain Description:  Pain Descriptors: Aching  Action:  [x] Acceptable for treatment  []  Other:    OBJECTIVE:   Exercises  Exercise 1: posture exs x15  Exercise 2: pulleys flex/abd x 4 min ea  Exercise 11: cane raises with instructions for correct technique  Exercise 20: HEP: cont current    Manual: [x]  NA    Modalities: []  NA  Modalities  Ultrasound: 1.2 w/cm2, 2.0 MHz, x5 minutes Rt anterolateral shldr      Mobility: [x]  NA    Strength: [] NT        Strength RUE  R Shoulder Flexion: 4-/5  R Shoulder Extension: 4/5  R Shoulder ABduction: 4-/5  R Shoulder Internal Rotation: 4/5  R Shoulder External Rotation: 4/5  R Elbow Flexion: 4/5  R Elbow Extension: 4/5  R Wrist Flexion: 4/5      ROM: [] NT   AROM RUE (degrees)  R Shoulder Flexion 0-180: 160  R Shoulder ABduction 0-180: 135  R Shoulder Int Rotation  0-70: 75  R Shoulder Ext Rotation 0-90: 55        Spine  Cervical: flex 45, ext 33, Rt SB 33,

## 2017-12-05 NOTE — DISCHARGE SUMMARY
[facility]   [address] [place], [place]  Phone:  [phone]   Fax:  [phone]    [] Certification  [] Recertification []  Plan of Care  [] Progress Note   [x] Discharge        To:  Referring Practitioner: Dr. Escobedo [place]   From:  Savannah Malin, PT  Patient: Shaina Josellor     : 1950  Diagnosis: Rt shoulder pain    10/26/17  Date: 2017  Treatment Diagnosis: Rt UE pain, Rt UE weakness, decreased Rt UE ROM, impaired ADLs, decreased cervical ROM     Plan of Care/Certification Expiration Date: 17  Progress Report Period from: 10/26/17  to 2017    Total # of Visits to Date: 6   No Show: 0    Canceled Appointment: 0     OBJECTIVE:   Short Term Goals - Time Frame for Short term goals: 2 wks     Goals Current/Discharge status  Met   Short term goal 1: Independent with HEP for strengthening, flexibility, and improved function   Indep with current HEP, education for HEP progression [x] yes  [] no     Long Term Goals - Time Frame for Long term goals : 6 wks   Goals Current/ Discharge status Met   Long term goal 1: Improve Rt UE strength >/= 4+/5 to allow improve ease with vacuuming Strength RUE  R Shoulder Flexion: 4-/5  R Shoulder Extension: 4/5  R Shoulder ABduction: 4-/5  R Shoulder Internal Rotation: 4/5  R Shoulder External Rotation: 4/5  R Elbow Flexion: 4/5  R Elbow Extension: 4/5  R Wrist Flexion: 4/5    [] yes  [x] no   Long term goal 2: Improve Rt UE ROM WFL to allow pt to wash and do hair without difficulty  AROM RUE (degrees)  R Shoulder Flexion 0-180: 160  R Shoulder ABduction 0-180: 135  R Shoulder Int Rotation  0-70: 75  R Shoulder Ext Rotation 0-90: 55    [] yes  [x] no   Long term goal 3: Improve cervical ROM by 5-10 degrees each plane for improved posture and decreased pain UT  Spine  Cervical: flex 45, ext 33, Rt SB 33, Lt SB 38    [] yes  [x] no   Long term goal 4: UEFI >/= 60/80 to demonstrate overall improved function

## 2017-12-30 DIAGNOSIS — M79.7 FIBROMYALGIA: ICD-10-CM

## 2017-12-30 DIAGNOSIS — M17.12 PRIMARY OSTEOARTHRITIS OF LEFT KNEE: ICD-10-CM

## 2017-12-30 DIAGNOSIS — M25.511 CHRONIC RIGHT SHOULDER PAIN: ICD-10-CM

## 2017-12-30 DIAGNOSIS — G89.29 CHRONIC RIGHT SHOULDER PAIN: ICD-10-CM

## 2017-12-30 RX ORDER — DICLOFENAC SODIUM 75 MG/1
75 TABLET, DELAYED RELEASE ORAL 2 TIMES DAILY
Qty: 180 TABLET | Refills: 1 | Status: SHIPPED | OUTPATIENT
Start: 2017-12-30

## 2017-12-30 RX ORDER — TIZANIDINE 2 MG/1
TABLET ORAL
Qty: 270 TABLET | Refills: 1 | Status: SHIPPED | OUTPATIENT
Start: 2017-12-30 | End: 2018-07-29 | Stop reason: SDUPTHER

## 2018-06-01 LAB
AVERAGE GLUCOSE: NORMAL
HBA1C MFR BLD: 7.1 %

## 2018-07-27 DIAGNOSIS — M79.7 FIBROMYALGIA: ICD-10-CM

## 2018-07-27 RX ORDER — TIZANIDINE 2 MG/1
TABLET ORAL
Qty: 270 TABLET | Refills: 1 | OUTPATIENT
Start: 2018-07-27

## 2018-07-29 DIAGNOSIS — M79.7 FIBROMYALGIA: ICD-10-CM

## 2018-07-29 RX ORDER — TIZANIDINE 2 MG/1
TABLET ORAL
Qty: 90 TABLET | Refills: 0 | Status: SHIPPED | OUTPATIENT
Start: 2018-07-29

## 2018-07-29 NOTE — TELEPHONE ENCOUNTER
Pt is going to make an appointment.  She is out of medication and would like short term of medication

## 2024-08-26 ENCOUNTER — OFFICE VISIT (OUTPATIENT)
Dept: PRIMARY CARE | Facility: CLINIC | Age: 74
End: 2024-08-26
Payer: MEDICARE

## 2024-08-26 VITALS
BODY MASS INDEX: 33.06 KG/M2 | SYSTOLIC BLOOD PRESSURE: 128 MMHG | TEMPERATURE: 97.3 F | DIASTOLIC BLOOD PRESSURE: 80 MMHG | HEIGHT: 63 IN | OXYGEN SATURATION: 95 % | RESPIRATION RATE: 16 BRPM | HEART RATE: 70 BPM | WEIGHT: 186.6 LBS

## 2024-08-26 DIAGNOSIS — E66.9 CLASS 1 OBESITY WITH BODY MASS INDEX (BMI) OF 33.0 TO 33.9 IN ADULT, UNSPECIFIED OBESITY TYPE, UNSPECIFIED WHETHER SERIOUS COMORBIDITY PRESENT: ICD-10-CM

## 2024-08-26 DIAGNOSIS — R68.89 FLU-LIKE SYMPTOMS: ICD-10-CM

## 2024-08-26 DIAGNOSIS — J00 NASOPHARYNGITIS: ICD-10-CM

## 2024-08-26 DIAGNOSIS — J34.89 NASAL CONGESTION WITH RHINORRHEA: ICD-10-CM

## 2024-08-26 DIAGNOSIS — J40 BRONCHITIS: Primary | ICD-10-CM

## 2024-08-26 DIAGNOSIS — R06.09 DYSPNEA ON EXERTION: ICD-10-CM

## 2024-08-26 DIAGNOSIS — R09.81 NASAL CONGESTION WITH RHINORRHEA: ICD-10-CM

## 2024-08-26 PROCEDURE — 87634 RSV DNA/RNA AMP PROBE: CPT

## 2024-08-26 PROCEDURE — 99202 OFFICE O/P NEW SF 15 MIN: CPT | Performed by: NURSE PRACTITIONER

## 2024-08-26 PROCEDURE — 87636 SARSCOV2 & INF A&B AMP PRB: CPT

## 2024-08-26 RX ORDER — ACETAMINOPHEN 500 MG
TABLET ORAL
COMMUNITY

## 2024-08-26 RX ORDER — LOSARTAN POTASSIUM 25 MG/1
TABLET ORAL
COMMUNITY
Start: 2024-08-25

## 2024-08-26 RX ORDER — METOPROLOL SUCCINATE 50 MG/1
TABLET, EXTENDED RELEASE ORAL
COMMUNITY
Start: 2024-08-14

## 2024-08-26 RX ORDER — VIT C/E/ZN/COPPR/LUTEIN/ZEAXAN 250MG-90MG
2000 CAPSULE ORAL DAILY
COMMUNITY

## 2024-08-26 RX ORDER — PANTOPRAZOLE SODIUM 20 MG/1
TABLET, DELAYED RELEASE ORAL
COMMUNITY
Start: 2024-02-26

## 2024-08-26 RX ORDER — CALC/MAG/B COMPLEX/D3/HERB 61
TABLET ORAL
COMMUNITY
Start: 2024-01-19

## 2024-08-26 RX ORDER — METFORMIN HYDROCHLORIDE 500 MG/1
TABLET ORAL
COMMUNITY

## 2024-08-26 RX ORDER — VALACYCLOVIR HYDROCHLORIDE 1 G/1
1000 TABLET, FILM COATED ORAL DAILY
COMMUNITY
Start: 2024-05-30

## 2024-08-26 RX ORDER — INSULIN GLARGINE 100 [IU]/ML
INJECTION, SOLUTION SUBCUTANEOUS
COMMUNITY

## 2024-08-26 RX ORDER — GABAPENTIN 100 MG/1
CAPSULE ORAL
COMMUNITY
Start: 2024-06-06

## 2024-08-26 RX ORDER — ISOSORBIDE MONONITRATE 30 MG/1
30 TABLET, EXTENDED RELEASE ORAL DAILY
COMMUNITY

## 2024-08-26 RX ORDER — DOXYCYCLINE 100 MG/1
100 CAPSULE ORAL 2 TIMES DAILY
Qty: 20 CAPSULE | Refills: 0 | Status: SHIPPED | OUTPATIENT
Start: 2024-08-26 | End: 2024-09-05

## 2024-08-26 RX ORDER — PEN NEEDLE, DIABETIC 32GX 5/32"
NEEDLE, DISPOSABLE MISCELLANEOUS
COMMUNITY
Start: 2024-08-06

## 2024-08-26 RX ORDER — ASPIRIN 81 MG/1
81 TABLET ORAL DAILY
COMMUNITY

## 2024-08-26 RX ORDER — NITROGLYCERIN 0.4 MG/1
TABLET SUBLINGUAL
COMMUNITY
Start: 2024-07-09

## 2024-08-26 RX ORDER — ATORVASTATIN CALCIUM 80 MG/1
80 TABLET, FILM COATED ORAL DAILY
COMMUNITY
Start: 2024-07-12

## 2024-08-26 RX ORDER — BROMPHENIRAMINE MALEATE, PSEUDOEPHEDRINE HYDROCHLORIDE, AND DEXTROMETHORPHAN HYDROBROMIDE 2; 30; 10 MG/5ML; MG/5ML; MG/5ML
5 SYRUP ORAL 4 TIMES DAILY PRN
Qty: 120 ML | Refills: 1 | Status: SHIPPED | OUTPATIENT
Start: 2024-08-26 | End: 2024-09-05

## 2024-08-26 RX ORDER — FLASH GLUCOSE SENSOR
KIT MISCELLANEOUS
COMMUNITY
Start: 2024-08-06

## 2024-08-26 RX ORDER — EZETIMIBE 10 MG/1
10 TABLET ORAL DAILY
COMMUNITY

## 2024-08-26 RX ORDER — PREDNISONE 10 MG/1
TABLET ORAL
Qty: 30 TABLET | Refills: 0 | Status: SHIPPED | OUTPATIENT
Start: 2024-08-26 | End: 2024-09-05

## 2024-08-26 RX ORDER — GABAPENTIN 300 MG/1
CAPSULE ORAL
COMMUNITY
Start: 2024-06-06

## 2024-08-26 RX ORDER — GLIMEPIRIDE 4 MG/1
4 TABLET ORAL
COMMUNITY

## 2024-08-26 RX ORDER — TIZANIDINE 4 MG/1
TABLET ORAL
COMMUNITY
Start: 2024-07-19

## 2024-08-26 ASSESSMENT — PATIENT HEALTH QUESTIONNAIRE - PHQ9
1. LITTLE INTEREST OR PLEASURE IN DOING THINGS: NOT AT ALL
SUM OF ALL RESPONSES TO PHQ9 QUESTIONS 1 AND 2: 0
2. FEELING DOWN, DEPRESSED OR HOPELESS: NOT AT ALL

## 2024-08-26 ASSESSMENT — ENCOUNTER SYMPTOMS
LOSS OF SENSATION IN FEET: 0
OCCASIONAL FEELINGS OF UNSTEADINESS: 0
DEPRESSION: 0

## 2024-08-26 NOTE — PROGRESS NOTES
Subjective   Patient ID: Elizabeth Ram is a 74 y.o. female who is with chief complaint of productive cough.     HPI  Patient is a 74 y.o. female who CONSULTED AT [facility] today. Patient is with complaints of nasal congestion, mild nasal discharge, headache, mild sinus pain, post nasal drip, productive cough, shortness of breath on exertion, intermittent wheezing, fatigue, muscle aches, and intermittent diarrhea. She denies having any sore throat, loss of sense of taste, loss of sense of smell, chills, nor fever. Patient states that present condition started about 2 weeks ago. Patient denies history of recent travel, exposure to person/people who tested positive for COVID 19, nor exposure to person/people with flu like symptoms. she denies chest pain, palpitations, nor edema. she stated that she  tried OTC medications which afforded only slight relief of symptoms. she denies nausea, vomiting, abdominal pain, nor any other symptoms.    Patient states she had her COVID vaccine.  Patient states she had the flu shot for this season.    Review of Systems  General: no weight loss, generally healthy, (+) fatigue  Head: (+) headache, (+) mild sinus pain, no vertigo, no injury  Eyes: no diplopia, no tearing, no pain,   Ears: no change in hearing, no tinnitus, no bleeding, no vertigo  Mouth:  no dental difficulties, no gingival bleeding, no sore throat, no loss of sense of taste, (+) post nasal drip,   Nose: (+) congestion, (+) mild discharge, no bleeding, no obstruction, no loss of sense of smell  Neck: no stiffness, no pain, no tenderness, no masses, no bruit  Pulmonary: (+) dyspnea on exertion, (+) intermittent wheezing, no hemoptysis, (+) productive cough  Cardiovascular: no chest pain, no palpitations, no syncope, no orthopnea  Gastrointestinal: no change in appetite, no dysphagia, no abdominal pains, (+) intermittent diarrhea, no emesis, no melena  Genito Urinary: no dysuria, no urinary urgency,  no nocturia, no incontinence, no change in nature of urine  Musculoskeletal: (+) muscle ache, no joint pain, no limitation of range of motion, no paresthesia, no numbness  Constitutional: no fever, no chills, no night sweats    Objective   Physical Exam  General: ambulatory, in no acute distress  Head: normocephalic, no lesions, (+) sinus tenderness  Eyes: pink palpebral conjunctiva, anicteric sclerae, PERRLA, EOM's full  Ears: clear external auditory canals, no ear discharge, no bleeding from the ears, tympanic membrane intact  Nose: (+) congested nasal mucosa, (+) yellow mucoid nasal discharge, no bleeding, no obstruction  Throat: (+) erythema, and (+) exudate on posterior pharyngeal wall, no lesion  Neck: supple, no masses, no bruits, no CLADP  Chest: symmetrical chest expansion, no lagging, no retractions, (+) harsh breath sounds, (+) diffuse rhonchi on both lung fields, no rales, no wheezes  Extremities: full and equal peripheral pulses, no edema,     Assessment/Plan   Problem List Items Addressed This Visit    None  Visit Diagnoses         Codes    Bronchitis    -  Primary J40    Relevant Medications    predniSONE (Deltasone) 10 mg tablet    doxycycline (Vibramycin) 100 mg capsule    brompheniramine-pseudoeph-DM 2-30-10 mg/5 mL syrup    Nasal congestion with rhinorrhea     R09.81, J34.89    Relevant Medications    predniSONE (Deltasone) 10 mg tablet    doxycycline (Vibramycin) 100 mg capsule    brompheniramine-pseudoeph-DM 2-30-10 mg/5 mL syrup    Nasopharyngitis     J00    Relevant Medications    predniSONE (Deltasone) 10 mg tablet    doxycycline (Vibramycin) 100 mg capsule    brompheniramine-pseudoeph-DM 2-30-10 mg/5 mL syrup    Dyspnea on exertion     R06.09    Relevant Medications    predniSONE (Deltasone) 10 mg tablet    doxycycline (Vibramycin) 100 mg capsule    brompheniramine-pseudoeph-DM 2-30-10 mg/5 mL syrup    Flu-like symptoms     R68.89    Relevant Orders    Sars-CoV-2 PCR    Influenza A, and B  PCR    RSV PCR    BMI 33.0-33.9,adult     Z68.33    Class 1 obesity with body mass index (BMI) of 33.0 to 33.9 in adult, unspecified obesity type, unspecified whether serious comorbidity present     E66.9, Z68.33        Patient for COVID RSV and Flu testing.  Specimen collection done by MA   Patient tolerated procedure well  Will inform patient of result    DISCHARGE SUMMARY:   Patient was seen and examined. Diagnosis, treatment, treatment options, and possible complications of today's illness discussed and explained to patient. Patient to take medication/s associated with this visit. Patient may also take OTC analgesic/antipyretic if needed for pain/fever. Advised to increase oral fluid intake. Advised steam inhalation if needed to relieve congestion. Advised warm saline gargle if needed to relieve throat discomfort. Advised Listerine antiseptic mouthwash gargle TID. Patient may use Cepacol oral spray as needed to relieve throat discomfort. Patient was advised to discard the old toothbrush and use a new toothbrush beginning on the third of antibiotics. Advised to follow instructions with regards to COVID testing. Advised on social distancing and communicability prevention. Advised to come back if with worsening or persistent symptoms. Patient verbalized understanding of plan of care.    Patient to come back in 7 - 10 days if needed for worsening symptoms.       SOLANGE Williamson-CNP 08/26/24 10:30 AM

## 2024-08-26 NOTE — PROGRESS NOTES
"Subjective   Patient ID: Elizabeth Ram is a 74 y.o. female who presents for No chief complaint on file..    Symptoms: cough, runny nose, congestion,mild headache,diarrhea  Length of symptoms: 2 weeks ago.  OTC: tylenol  with mild help.  Related information: pt did take antibiotics  amoxicillin for her dental appointment. 8/20/2024    HPI     Review of Systems    Objective   /82 Comment: auto  Pulse 70   Temp 36.3 °C (97.3 °F)   Resp 16   Ht 1.6 m (5' 3\")   Wt 84.6 kg (186 lb 9.6 oz)   SpO2 95%   BMI 33.05 kg/m²     Physical Exam    Assessment/Plan          "

## 2024-08-27 ENCOUNTER — DOCUMENTATION (OUTPATIENT)
Dept: PRIMARY CARE | Facility: CLINIC | Age: 74
End: 2024-08-27
Payer: MEDICARE

## 2024-08-27 ENCOUNTER — TELEPHONE (OUTPATIENT)
Dept: PRIMARY CARE | Facility: CLINIC | Age: 74
End: 2024-08-27

## 2024-08-27 LAB
FLUAV RNA RESP QL NAA+PROBE: NOT DETECTED
FLUBV RNA RESP QL NAA+PROBE: NOT DETECTED
RSV RNA RESP QL NAA+PROBE: NOT DETECTED
SARS-COV-2 ORF1AB RESP QL NAA+PROBE: NOT DETECTED

## 2024-08-27 NOTE — TELEPHONE ENCOUNTER
PT STATES SHE HAD A MISSED CALL, DID ANYONE TRY TO CALL PT? NO MESSAGES- WAS SEEN YESTERDAY IN [facility].

## 2024-09-01 DIAGNOSIS — J00 NASOPHARYNGITIS: ICD-10-CM

## 2024-09-01 DIAGNOSIS — J40 BRONCHITIS: ICD-10-CM

## 2024-09-01 DIAGNOSIS — R09.81 NASAL CONGESTION WITH RHINORRHEA: ICD-10-CM

## 2024-09-01 DIAGNOSIS — J34.89 NASAL CONGESTION WITH RHINORRHEA: ICD-10-CM

## 2024-09-01 DIAGNOSIS — R06.09 DYSPNEA ON EXERTION: ICD-10-CM

## 2024-09-02 RX ORDER — BROMPHENIRAMINE MALEATE, PSEUDOEPHEDRINE HYDROCHLORIDE, AND DEXTROMETHORPHAN HYDROBROMIDE 2; 30; 10 MG/5ML; MG/5ML; MG/5ML
5 SYRUP ORAL 4 TIMES DAILY PRN
Qty: 118 ML | Refills: 1 | OUTPATIENT
Start: 2024-09-02 | End: 2024-09-12